# Patient Record
Sex: FEMALE | Race: WHITE
[De-identification: names, ages, dates, MRNs, and addresses within clinical notes are randomized per-mention and may not be internally consistent; named-entity substitution may affect disease eponyms.]

---

## 2018-03-15 ENCOUNTER — HOSPITAL ENCOUNTER (EMERGENCY)
Dept: HOSPITAL 45 - C.EDB | Age: 23
Discharge: HOME | End: 2018-03-15
Payer: COMMERCIAL

## 2018-03-15 VITALS — OXYGEN SATURATION: 98 % | TEMPERATURE: 98.06 F

## 2018-03-15 VITALS
WEIGHT: 156.53 LBS | HEIGHT: 65 IN | HEIGHT: 65 IN | WEIGHT: 156.53 LBS | BODY MASS INDEX: 26.08 KG/M2 | BODY MASS INDEX: 26.08 KG/M2

## 2018-03-15 VITALS — OXYGEN SATURATION: 98 %

## 2018-03-15 VITALS — DIASTOLIC BLOOD PRESSURE: 93 MMHG | SYSTOLIC BLOOD PRESSURE: 129 MMHG | HEART RATE: 112 BPM

## 2018-03-15 DIAGNOSIS — R55: Primary | ICD-10-CM

## 2018-03-15 DIAGNOSIS — R51: ICD-10-CM

## 2018-03-15 DIAGNOSIS — Z82.0: ICD-10-CM

## 2018-03-15 LAB
ALBUMIN SERPL-MCNC: 4.3 GM/DL (ref 3.4–5)
ALP SERPL-CCNC: 62 U/L (ref 45–117)
ALT SERPL-CCNC: 28 U/L (ref 12–78)
AST SERPL-CCNC: 14 U/L (ref 15–37)
BASOPHILS # BLD: 0.02 K/UL (ref 0–0.2)
BASOPHILS NFR BLD: 0.2 %
BUN SERPL-MCNC: 9 MG/DL (ref 7–18)
CALCIUM SERPL-MCNC: 9.2 MG/DL (ref 8.5–10.1)
CO2 SERPL-SCNC: 24 MMOL/L (ref 21–32)
CREAT SERPL-MCNC: 0.55 MG/DL (ref 0.6–1.2)
EOS ABS #: 0.03 K/UL (ref 0–0.5)
EOSINOPHIL NFR BLD AUTO: 223 K/UL (ref 130–400)
GLUCOSE SERPL-MCNC: 72 MG/DL (ref 70–99)
HCT VFR BLD CALC: 41.4 % (ref 37–47)
HGB BLD-MCNC: 14.8 G/DL (ref 12–16)
IG#: 0.02 K/UL (ref 0–0.02)
IMM GRANULOCYTES NFR BLD AUTO: 43.3 %
LYMPHOCYTES # BLD: 3.71 K/UL (ref 1.2–3.4)
MCH RBC QN AUTO: 33 PG (ref 25–34)
MCHC RBC AUTO-ENTMCNC: 35.7 G/DL (ref 32–36)
MCV RBC AUTO: 92.4 FL (ref 80–100)
MONO ABS #: 0.83 K/UL (ref 0.11–0.59)
MONOCYTES NFR BLD: 9.7 %
NEUT ABS #: 3.95 K/UL (ref 1.4–6.5)
NEUTROPHILS # BLD AUTO: 0.4 %
NEUTROPHILS NFR BLD AUTO: 46.2 %
PMV BLD AUTO: 9.6 FL (ref 7.4–10.4)
POTASSIUM SERPL-SCNC: 3.5 MMOL/L (ref 3.5–5.1)
PROT SERPL-MCNC: 7.8 GM/DL (ref 6.4–8.2)
RED CELL DISTRIBUTION WIDTH CV: 11.6 % (ref 11.5–14.5)
RED CELL DISTRIBUTION WIDTH SD: 39.5 FL (ref 36.4–46.3)
SODIUM SERPL-SCNC: 137 MMOL/L (ref 136–145)
WBC # BLD AUTO: 8.56 K/UL (ref 4.8–10.8)

## 2018-03-15 NOTE — DIAGNOSTIC IMAGING REPORT
CHEST ONE VIEW PORTABLE



HISTORY:  23 years-old Female EVALUATE ALTERED MENTAL STATUS/WEAKNESS acutely

altered mental status



COMPARISON: None available



TECHNIQUE: Portable AP view of the chest



FINDINGS: 

Cardiomediastinal and hilar silhouettes are within normal limits. No

pneumothorax, pleural effusion, focal airspace consolidation or overt pulmonary

edema. The bones of the chest appear grossly intact.



IMPRESSION: No acute process. 







The above report was generated using voice recognition software. It may contain

grammatical, syntax or spelling errors.







Electronically signed by:  David Hutson M.D.

3/15/2018 7:37 PM



Dictated Date/Time:  3/15/2018 7:36 PM

## 2018-03-15 NOTE — DIAGNOSTIC IMAGING REPORT
HEAD WITHOUT CONTRAST (CT)



CLINICAL HISTORY: 23 years-old Female with EVALUATE ALTERED MENTAL

STATUS/WEAKNESS.  Acute weakness with altered mental status  



TECHNIQUE: Multiple axial CT images of the head were obtained without contrast. 

A dose lowering technique was utilized adhering to the principles of ALARA.



CT DOSE:  537.48 mGy.cm



COMPARISON: None.



FINDINGS: 



No acute intracranial hemorrhage, midline shift, intracranial mass,

hydrocephalus, territorial ischemia or abnormal extra-axial collection.



The calvarium is intact.  The paranasal sinuses, mastoid air cells, and middle

ear cavities are clear.



IMPRESSION:  No acute intracranial abnormality.







The above report was generated using voice recognition software. It may contain

grammatical, syntax or spelling errors.







Electronically signed by:  David Hutson M.D.

3/15/2018 8:01 PM



Dictated Date/Time:  3/15/2018 8:00 PM

## 2018-03-16 NOTE — EMERGENCY ROOM VISIT NOTE
History


Report prepared by Chastityibjunito:  Rell Fallon


Under the Supervision of:  Dr. Micky Lowery D.O.


First contact with patient:  18:56


Stated Complaint:  SYNCOPE





History of Present Illness


The patient is a 23 year old female who presents to the Emergency Room with 

complaints of a resolved near syncopal episode that occurred prior to arrival. 

The patient states that she has been experiencing intermittent posterior head 

pressure and a feeling "as if I am going to pass out" for the last month. The 

patient states that the pressure occurs randomly throughout the day and is not 

changed with position. She reports that she has seen her physician at home 

where she was given muscle relaxers because they thought the pain was due to 

musculoskeletal neck pain. The patient states that she had an MRA done three 

days ago, which was negative. She reports that today she started to experience 

the posterior head pressure on the right side in class. The patient states that 

when she got home, she took a muscle relaxer and took a nap. She reports that 

when she woke up and stood up, the headache wrapped around her head and her 

eyes "blanked out". The patient denies any loss of consciousness. The patient 

denies fevers, chest pain, shortness of breath, nausea, vomiting, diarrhea, 

pain with urination, and melena. She denies diabetes, hypertension, 

hyperlipidemia, CAD, history of sudden death at a young age, and smoking. The 

patient reports a family history of brain aneurysms, which caused her 

grandfather to die at the age of 30.





   Source of History:  patient


   Onset:  prior to arrival


   Position:  other (global)


   Timing:  resolved


   Associated Symptoms:  No fevers, No chest pain, No SOB, No nausea, No 

vomiting, No melena, No diarrhea, No urinary symptoms





Review of Systems


See HPI for pertinent positives & negatives. A total of 10 systems reviewed and 

were otherwise negative.





Past Medical & Surgical


Medical Problems:


(1) No known health problems








Family History





FH: aneurysm





Social History


Marital Status:  single


Housing Status:  lives with roommate


Occupation Status:  [employer] student





Current/Historical Medications


Scheduled PRN


Cyclobenzaprine Hcl (Flexeril), 5 MG PO HS PRN for Muscle Spasms


Lorazepam (Ativan), 0.25 MG PO TID PRN for Anxiety





Allergies


Coded Allergies:  


     No Known Allergies (Unverified , 3/15/18)





Physical Exam


Vital Signs











  Date Time  Temp Pulse Resp B/P (MAP) Pulse Ox O2 Delivery O2 Flow Rate FiO2


 


3/15/18 21:24  106  122/82    





  96  135/89    





  112  129/93    


 


3/15/18 19:42     98 Room Air  


 


3/15/18 19:19 36.7 88 17 143/96 98 Room Air  











Physical Exam


GENERAL: Sitting up in bed, alert, well appearing, well nourished, no distress, 

non-toxic 


EYE EXAM: normal conjunctiva. PERRL and EOM's intact.


OROPHARYNX: no exudate, no erythema, lips, buccal mucosa, and tongue normal and 

mucous membranes are moist


NECK: supple, no nuchal rigidity, no adenopathy, acute reproducible tenderness 

bilaterally to the cervical paraspinal region, worse on right side tracking to 

trapezius.


LUNGS: Clear to auscultation. Normal chest wall mechanics


HEART: no murmurs, S1 normal and S2 normal 


ABDOMEN: abdomen soft, non-tender, normo-active bowel sounds, no masses, no 

rebound or guarding. 


BACK: Back is symmetrical on inspection and there is no deformity, no midline 

tenderness, no CVA tenderness. 


SKIN: no rashes and no bruising 


UPPER EXTREMITIES: upper extremities are grossly normal. 


LOWER EXTREMITIES: No pitting edema.


NEURO EXAM: Normal sensorium, cranial nerves II-XII grossly intact, normal 

speech,  no gross weakness of arms, no gross weakness of legs. No drift. Finger 

to nose intact. Gross sensation intact.





Medical Decision & Procedures


ER Provider


Diagnostic Interpretation:


Radiology results as stated below per my review and the radiologist's 

interpretation: 





HEAD WITHOUT CONTRAST (CT)





CLINICAL HISTORY: 23 years-old Female with EVALUATE ALTERED MENTAL


STATUS/WEAKNESS.  Acute weakness with altered mental status  





TECHNIQUE: Multiple axial CT images of the head were obtained without contrast. 


A dose lowering technique was utilized adhering to the principles of ALARA.





CT DOSE:  537.48 mGy.cm





COMPARISON: None.





FINDINGS: 





No acute intracranial hemorrhage, midline shift, intracranial mass,


hydrocephalus, territorial ischemia or abnormal extra-axial collection.





The calvarium is intact.  The paranasal sinuses, mastoid air cells, and middle


ear cavities are clear.





IMPRESSION:  No acute intracranial abnormality.











The above report was generated using voice recognition software. It may contain


grammatical, syntax or spelling errors.











Electronically signed by:  David Hutson M.D.


3/15/2018 8:01 PM





Dictated Date/Time:  3/15/2018 8:00 PM








CHEST ONE VIEW PORTABLE





HISTORY:  23 years-old Female EVALUATE ALTERED MENTAL STATUS/WEAKNESS acutely


altered mental status





COMPARISON: None available





TECHNIQUE: Portable AP view of the chest





FINDINGS: 


Cardiomediastinal and hilar silhouettes are within normal limits. No


pneumothorax, pleural effusion, focal airspace consolidation or overt pulmonary


edema. The bones of the chest appear grossly intact.





IMPRESSION: No acute process. 











The above report was generated using voice recognition software. It may contain


grammatical, syntax or spelling errors.











Electronically signed by:  David Hutson M.D.


3/15/2018 7:37 PM





Dictated Date/Time:  3/15/2018 7:36 PM





Laboratory Results


3/15/18 19:26








Red Blood Count 4.48, Mean Corpuscular Volume 92.4, Mean Corpuscular Hemoglobin 

33.0, Mean Corpuscular Hemoglobin Concent 35.7, Mean Platelet Volume 9.6, 

Neutrophils (%) (Auto) 46.2, Lymphocytes (%) (Auto) 43.3, Monocytes (%) (Auto) 

9.7, Eosinophils (%) (Auto) 0.4, Basophils (%) (Auto) 0.2, Neutrophils # (Auto) 

3.95, Lymphocytes # (Auto) 3.71, Monocytes # (Auto) 0.83, Eosinophils # (Auto) 

0.03, Basophils # (Auto) 0.02





3/15/18 19:26

















Test


  3/15/18


19:26 3/15/18


21:15


 


White Blood Count


  8.56 K/uL


(4.8-10.8) 


 


 


Red Blood Count


  4.48 M/uL


(4.2-5.4) 


 


 


Hemoglobin


  14.8 g/dL


(12.0-16.0) 


 


 


Hematocrit 41.4 % (37-47)  


 


Mean Corpuscular Volume


  92.4 fL


() 


 


 


Mean Corpuscular Hemoglobin


  33.0 pg


(25-34) 


 


 


Mean Corpuscular Hemoglobin


Concent 35.7 g/dl


(32-36) 


 


 


Platelet Count


  223 K/uL


(130-400) 


 


 


Mean Platelet Volume


  9.6 fL


(7.4-10.4) 


 


 


Neutrophils (%) (Auto) 46.2 %  


 


Lymphocytes (%) (Auto) 43.3 %  


 


Monocytes (%) (Auto) 9.7 %  


 


Eosinophils (%) (Auto) 0.4 %  


 


Basophils (%) (Auto) 0.2 %  


 


Neutrophils # (Auto)


  3.95 K/uL


(1.4-6.5) 


 


 


Lymphocytes # (Auto)


  3.71 K/uL


(1.2-3.4) 


 


 


Monocytes # (Auto)


  0.83 K/uL


(0.11-0.59) 


 


 


Eosinophils # (Auto)


  0.03 K/uL


(0-0.5) 


 


 


Basophils # (Auto)


  0.02 K/uL


(0-0.2) 


 


 


RDW Standard Deviation


  39.5 fL


(36.4-46.3) 


 


 


RDW Coefficient of Variation


  11.6 %


(11.5-14.5) 


 


 


Immature Granulocyte % (Auto) 0.2 %  


 


Immature Granulocyte # (Auto)


  0.02 K/uL


(0.00-0.02) 


 


 


Anion Gap


  9.0 mmol/L


(3-11) 


 


 


Est Creatinine Clear Calc


Drug Dose 159.9 ml/min 


  


 


 


Estimated GFR (


American) > 150.0 


  


 


 


Estimated GFR (Non-


American 134.1 


  


 


 


BUN/Creatinine Ratio 15.6 (10-20)  


 


Bedside Glucose


  85 mg/dl


(70-90) 


 


 


Calcium Level


  9.2 mg/dl


(8.5-10.1) 


 


 


Total Bilirubin


  0.6 mg/dl


(0.2-1) 


 


 


Direct Bilirubin


  0.2 mg/dl


(0-0.2) 


 


 


Aspartate Amino Transf


(AST/SGOT) 14 U/L (15-37) 


  


 


 


Alanine Aminotransferase


(ALT/SGPT) 28 U/L (12-78) 


  


 


 


Alkaline Phosphatase


  62 U/L


() 


 


 


Troponin I


  < 0.015 ng/ml


(0-0.045) 


 


 


Total Protein


  7.8 gm/dl


(6.4-8.2) 


 


 


Albumin


  4.3 gm/dl


(3.4-5.0) 


 


 


Thyroid Stimulating Hormone


(TSH) 3.110 uIu/ml


(0.300-4.500) 


 


 


Urine Color  YELLOW 


 


Urine Appearance  CLEAR (CLEAR) 


 


Urine pH  7.0 (4.5-7.5) 


 


Urine Specific Gravity


  


  1.022


(1.000-1.030)


 


Urine Protein  NEG (NEG) 


 


Urine Glucose (UA)  NEG (NEG) 


 


Urine Ketones  3+ (NEG) 


 


Urine Occult Blood  NEG (NEG) 


 


Urine Nitrite  NEG (NEG) 


 


Urine Bilirubin  NEG (NEG) 


 


Urine Urobilinogen  NEG (NEG) 


 


Urine Leukocyte Esterase  NEG (NEG) 


 


Urine Pregnancy Test  NEG (NEG) 





Laboratory results per my review.





Medications Administered











 Medications


  (Trade)  Dose


 Ordered  Sig/Zandra


 Route  Start Time


 Stop Time Status Last Admin


Dose Admin


 


 Sodium Chloride  1,000 ml @ 


 999 mls/hr  Q1H1M STAT


 IV  3/15/18 19:10


 3/15/18 20:10 DC 3/15/18 19:10


999 MLS/HR


 


 Ketorolac


 Tromethamine


  (Toradol Inj)  30 mg  NOW  STAT


 IV  3/15/18 19:10


 3/15/18 19:12 DC 3/15/18 19:10


30 MG











ECG Per My Interpretation


Indication:  syncope


Rate (beats per minute):  92


Rhythm:  sinus rhythm


Findings:  other (Normal axis and intervals)





ED Course


ED COURSE: 


Vital signs were reviewed and showed normal


The patients medical record was reviewed


The above diagnostic studies were performed and reviewed.


ED treatments and interventions as stated above. 





1903: The patient was evaluated in room D03A. A complete history and physical 

examination was performed.





2019: I reevaluated the patient and she is doing well.





2126: Upon reevaluation, the patient is resting comfortably. I discussed my 

findings with the patient and she understands and agrees with the treatment 

plan.   


Based on the patients age, coexisting illnesses, exam and lab findings the 

decision to treat as an outpatient was made.


The patient remained stable while under my care.


The patient appeared well at the time of discharge.





Medical Decision


Differential diagnosis includes etiologies such as vasovagal event, infection, 

hypoglycemia, electrolyte abnormalities, cardiac sources, intracerebral event, 

toxicologic, neurologic, as well as others were entertained.





Patient is a 23-year-old female who presents to ER as she stood up after 

sleeping and taking Flexeril for neck pain and almost passed out.  She did not 

lose consciousness.  She recently had an MRA which was negative for any 

aneurysms as she does have a family history of this.  Neurologic exam is 

completely intact.  CBC along with BMP, LFTs, bilirubin, troponin and TSH were 

normal.  UA was negative.  Pregnancy was negative.  CT head was unremarkable.  

EKG was unremarkable as well.  Patient was updated at bedside.  She was 

discharged to follow-up with PCP as an outpatient.  I do favor this is likely 

multifactorial and secondary to taking the Flexeril and standing up too quickly 

from sleeping.  She was given IV fluids while in the ER along with IV Toradol.  

She did feel much better and was discharged. Discussed with Pt concerning signs 

and symptoms to watch out for. Pt was instructed to follow up with their PCP 

and discussed with the patient their option to return to the ED at anytime for 

persistent or worsening symptoms. The appropriate anticipatory guidance and out-

patient management, including indications for return to the emergency department

, were explained at length to the patient and understood.





Medication Reconcilliation


Current Medication List:  was personally reviewed by me





Blood Pressure Screening


Patient's blood pressure:  Normal blood pressure





Impression





 Primary Impression:  


 Near syncope





Scribe Attestation


The scribe's documentation has been prepared under my direction and personally 

reviewed by me in its entirety. I confirm that the note above accurately 

reflects all work, treatment, procedures, and medical decision making performed 

by me.





Departure Information


Dispostion


Home / Self-Care





Forms


HOME CARE DOCUMENTATION FORM,                                                 

               IMPORTANT VISIT INFORMATION





Patient Instructions


ED Near Syncope Unkn, My [facility]





Additional Instructions





Please follow up with your primary care doctor or if you are a student, 

[facility] with in the next 24 hours.  Any worsening of your 

symptoms, please return to the ED immediately. This includes any fevers greater 

than 100.4, worsening pain, chest pain, shortness breath, persistent nausea, 

vomiting, unable to eat or drink, or any other concerning signs or symptoms 

from your standpoint.





Please take Tylenol or Motrin as needed for pain.